# Patient Record
Sex: MALE | URBAN - METROPOLITAN AREA
[De-identification: names, ages, dates, MRNs, and addresses within clinical notes are randomized per-mention and may not be internally consistent; named-entity substitution may affect disease eponyms.]

---

## 2021-11-16 ENCOUNTER — EMERGENCY (EMERGENCY)
Facility: HOSPITAL | Age: 51
LOS: 1 days | Discharge: HOME | End: 2021-11-16
Attending: EMERGENCY MEDICINE
Payer: COMMERCIAL

## 2021-11-16 VITALS
OXYGEN SATURATION: 97 % | HEIGHT: 70 IN | DIASTOLIC BLOOD PRESSURE: 102 MMHG | RESPIRATION RATE: 18 BRPM | SYSTOLIC BLOOD PRESSURE: 143 MMHG | HEART RATE: 59 BPM | WEIGHT: 179.9 LBS | TEMPERATURE: 98 F

## 2021-11-16 VITALS
SYSTOLIC BLOOD PRESSURE: 141 MMHG | OXYGEN SATURATION: 96 % | TEMPERATURE: 98 F | HEART RATE: 80 BPM | DIASTOLIC BLOOD PRESSURE: 96 MMHG | RESPIRATION RATE: 16 BRPM

## 2021-11-16 LAB
ALBUMIN SERPL ELPH-MCNC: 4.4 G/DL — SIGNIFICANT CHANGE UP (ref 3.3–5)
ALP SERPL-CCNC: 112 U/L — SIGNIFICANT CHANGE UP (ref 40–120)
ALT FLD-CCNC: 23 U/L — SIGNIFICANT CHANGE UP (ref 10–45)
ANION GAP SERPL CALC-SCNC: 13 MMOL/L — SIGNIFICANT CHANGE UP (ref 5–17)
APTT BLD: 29.6 SEC — SIGNIFICANT CHANGE UP (ref 27.5–35.5)
AST SERPL-CCNC: 27 U/L — SIGNIFICANT CHANGE UP (ref 10–40)
BASOPHILS # BLD AUTO: 0.04 K/UL — SIGNIFICANT CHANGE UP (ref 0–0.2)
BASOPHILS NFR BLD AUTO: 0.9 % — SIGNIFICANT CHANGE UP (ref 0–2)
BILIRUB SERPL-MCNC: 0.3 MG/DL — SIGNIFICANT CHANGE UP (ref 0.2–1.2)
BUN SERPL-MCNC: 12 MG/DL — SIGNIFICANT CHANGE UP (ref 7–23)
CALCIUM SERPL-MCNC: 9.3 MG/DL — SIGNIFICANT CHANGE UP (ref 8.4–10.5)
CHLORIDE SERPL-SCNC: 102 MMOL/L — SIGNIFICANT CHANGE UP (ref 96–108)
CO2 SERPL-SCNC: 25 MMOL/L — SIGNIFICANT CHANGE UP (ref 22–31)
CREAT SERPL-MCNC: 0.69 MG/DL — SIGNIFICANT CHANGE UP (ref 0.5–1.3)
EOSINOPHIL # BLD AUTO: 0.13 K/UL — SIGNIFICANT CHANGE UP (ref 0–0.5)
EOSINOPHIL NFR BLD AUTO: 2.8 % — SIGNIFICANT CHANGE UP (ref 0–6)
GLUCOSE SERPL-MCNC: 118 MG/DL — HIGH (ref 70–99)
HCT VFR BLD CALC: 46.4 % — SIGNIFICANT CHANGE UP (ref 39–50)
HGB BLD-MCNC: 16.1 G/DL — SIGNIFICANT CHANGE UP (ref 13–17)
IMM GRANULOCYTES NFR BLD AUTO: 0.4 % — SIGNIFICANT CHANGE UP (ref 0–1.5)
INR BLD: 0.89 RATIO — SIGNIFICANT CHANGE UP (ref 0.88–1.16)
LYMPHOCYTES # BLD AUTO: 1.92 K/UL — SIGNIFICANT CHANGE UP (ref 1–3.3)
LYMPHOCYTES # BLD AUTO: 41.3 % — SIGNIFICANT CHANGE UP (ref 13–44)
MCHC RBC-ENTMCNC: 31.7 PG — SIGNIFICANT CHANGE UP (ref 27–34)
MCHC RBC-ENTMCNC: 34.7 GM/DL — SIGNIFICANT CHANGE UP (ref 32–36)
MCV RBC AUTO: 91.3 FL — SIGNIFICANT CHANGE UP (ref 80–100)
MONOCYTES # BLD AUTO: 0.39 K/UL — SIGNIFICANT CHANGE UP (ref 0–0.9)
MONOCYTES NFR BLD AUTO: 8.4 % — SIGNIFICANT CHANGE UP (ref 2–14)
NEUTROPHILS # BLD AUTO: 2.15 K/UL — SIGNIFICANT CHANGE UP (ref 1.8–7.4)
NEUTROPHILS NFR BLD AUTO: 46.2 % — SIGNIFICANT CHANGE UP (ref 43–77)
NRBC # BLD: 0 /100 WBCS — SIGNIFICANT CHANGE UP (ref 0–0)
PLATELET # BLD AUTO: 226 K/UL — SIGNIFICANT CHANGE UP (ref 150–400)
POTASSIUM SERPL-MCNC: 4.3 MMOL/L — SIGNIFICANT CHANGE UP (ref 3.5–5.3)
POTASSIUM SERPL-SCNC: 4.3 MMOL/L — SIGNIFICANT CHANGE UP (ref 3.5–5.3)
PROT SERPL-MCNC: 7.3 G/DL — SIGNIFICANT CHANGE UP (ref 6–8.3)
PROTHROM AB SERPL-ACNC: 10.7 SEC — SIGNIFICANT CHANGE UP (ref 10.6–13.6)
RBC # BLD: 5.08 M/UL — SIGNIFICANT CHANGE UP (ref 4.2–5.8)
RBC # FLD: 12.2 % — SIGNIFICANT CHANGE UP (ref 10.3–14.5)
SODIUM SERPL-SCNC: 140 MMOL/L — SIGNIFICANT CHANGE UP (ref 135–145)
TROPONIN T, HIGH SENSITIVITY RESULT: <6 NG/L — SIGNIFICANT CHANGE UP (ref 0–51)
WBC # BLD: 4.65 K/UL — SIGNIFICANT CHANGE UP (ref 3.8–10.5)
WBC # FLD AUTO: 4.65 K/UL — SIGNIFICANT CHANGE UP (ref 3.8–10.5)

## 2021-11-16 PROCEDURE — 82962 GLUCOSE BLOOD TEST: CPT

## 2021-11-16 PROCEDURE — 85025 COMPLETE CBC W/AUTO DIFF WBC: CPT

## 2021-11-16 PROCEDURE — 70450 CT HEAD/BRAIN W/O DYE: CPT | Mod: MA

## 2021-11-16 PROCEDURE — 70498 CT ANGIOGRAPHY NECK: CPT | Mod: MA

## 2021-11-16 PROCEDURE — 70496 CT ANGIOGRAPHY HEAD: CPT | Mod: MA

## 2021-11-16 PROCEDURE — 99285 EMERGENCY DEPT VISIT HI MDM: CPT

## 2021-11-16 PROCEDURE — 0042T: CPT

## 2021-11-16 PROCEDURE — 70498 CT ANGIOGRAPHY NECK: CPT | Mod: 26,MA

## 2021-11-16 PROCEDURE — 85730 THROMBOPLASTIN TIME PARTIAL: CPT

## 2021-11-16 PROCEDURE — 80053 COMPREHEN METABOLIC PANEL: CPT

## 2021-11-16 PROCEDURE — 99284 EMERGENCY DEPT VISIT MOD MDM: CPT | Mod: 25

## 2021-11-16 PROCEDURE — 93005 ELECTROCARDIOGRAM TRACING: CPT

## 2021-11-16 PROCEDURE — 96374 THER/PROPH/DIAG INJ IV PUSH: CPT | Mod: XU

## 2021-11-16 PROCEDURE — 84484 ASSAY OF TROPONIN QUANT: CPT

## 2021-11-16 PROCEDURE — 85610 PROTHROMBIN TIME: CPT

## 2021-11-16 PROCEDURE — 70496 CT ANGIOGRAPHY HEAD: CPT | Mod: 26,MA

## 2021-11-16 RX ORDER — ONDANSETRON 8 MG/1
4 TABLET, FILM COATED ORAL ONCE
Refills: 0 | Status: COMPLETED | OUTPATIENT
Start: 2021-11-16 | End: 2021-11-16

## 2021-11-16 RX ORDER — MECLIZINE HCL 12.5 MG
1 TABLET ORAL
Qty: 15 | Refills: 0
Start: 2021-11-16

## 2021-11-16 RX ORDER — ONDANSETRON 8 MG/1
1 TABLET, FILM COATED ORAL
Qty: 6 | Refills: 0
Start: 2021-11-16

## 2021-11-16 RX ORDER — MECLIZINE HCL 12.5 MG
25 TABLET ORAL ONCE
Refills: 0 | Status: COMPLETED | OUTPATIENT
Start: 2021-11-16 | End: 2021-11-16

## 2021-11-16 RX ADMIN — Medication 25 MILLIGRAM(S): at 19:35

## 2021-11-16 RX ADMIN — ONDANSETRON 4 MILLIGRAM(S): 8 TABLET, FILM COATED ORAL at 19:35

## 2021-11-16 NOTE — ED ADULT NURSE NOTE - BREATHING, MLM
I spoke to [place]ula in Endo and she is aware the pt is cancelled.  Pt has a fever and a cough    I cancelled the appt in [facility]    Routed back to surgery scheduling to reschedule Spontaneous, unlabored and symmetrical

## 2021-11-16 NOTE — ED PROVIDER NOTE - OBJECTIVE STATEMENT
51M dizziness onset around 18:00PM associated with nausea and vomiting - multiple episodes. Patient states that he was walking to get some soda when his symptoms onset. No chest pain, abdominal pain. No recent illness 51M dizziness onset around 18:00PM associated with nausea and vomiting - multiple episodes. Patient states that he was walking to get some soda when his symptoms onset. No chest pain, abdominal pain. No recent illness, f/c. No ear pain. 51M dizziness onset around 18:00PM associated with nausea and vomiting - multiple episodes. Patient states that he was walking to get some soda when his symptoms onset. No chest pain, abdominal pain. No recent illness, f/c. No ear pain.  Attending Devi Sanderson: agree with above. additionally pt denies any falls or trauma. no recent uri. no recent neck manipulation. no black orbloody stools. no new medications.no numbness or tingling. symptoms improved with closing eyes in the ambulance. denies any chest or back pain.

## 2021-11-16 NOTE — ED PROVIDER NOTE - CLINICAL SUMMARY MEDICAL DECISION MAKING FREE TEXT BOX
Attending Devi Sanderson: 50 yo male presenting with dizziness. upon arrival code stroke called in triage and pt taken to ct scan. ct scans negative for acute pathology. concern for possible peripheral process with nystagmus and history. pt given anti-vertigo medications with improvement. no evidence of carotid or vertebral dissection. will d/w neuro

## 2021-11-16 NOTE — ED PROVIDER NOTE - NS ED ROS FT
CONST: no fevers, no chills, no trauma  EYES: no pain, no blurry vision   ENT: no sore throat, no epistaxis, no rhinorrhea  CV: no chest pain, no palpitations, no orthopnea, no extremity pain or swelling  RESP: no shortness of breath, no cough, no sputum, no pleurisy, no wheezing  ABD: no abdominal pain, no nausea, no vomiting, no diarrhea, no black or bloody stool  : no dysuria, no hematuria, no frequency, no urgency  MSK: no back pain, no neck pain, no extremity pain  NEURO: no headache, no sensory disturbances, no focal weakness, + dizziness  HEME: no easy bleeding or bruising  SKIN: no diaphoresis, no rash

## 2021-11-16 NOTE — ED PROVIDER NOTE - NSFOLLOWUPINSTRUCTIONS_ED_ALL_ED_FT
Please see attached information on Vertigo.     Return to the ER for new or worsening dizziness, vomiting, or any other concerning symptoms.     Follow up with ENT and neurology.      your medications and take as directed.

## 2021-11-16 NOTE — CONSULT NOTE ADULT - ASSESSMENT
51 year old right-handed male who reports no significant PMHx who p/w dizziness onset around 18:00PM 11/16/21, associated with nausea and vomiting. Patient states that he was walking to get some soda when his symptoms onset.     LKW: 18:00PM 11/16/21  mRS: 0  NIHSS: 0    tPA not offered -- symptoms more consistent with peripheral vertigo given unidirectional nystagmus, report of tinnitus, and improvement of symptoms after medical treatment (meclizine, ondansetron)  Thrombectomy not performed -- no LVO    Impression: vertiginous symptoms w/ high clinical suspicion for peripheral etiology (unidirectional right-beating nystagmus, tinnitus) w/ negative CT head, CT angiography, and CT perfusion    Plan:  - ***    * Case and plan to be discussed with Neurology Attending * 51 year old right-handed male who reports no significant PMHx who p/w dizziness onset around 18:00PM 11/16/21, associated with nausea and vomiting. Patient states that he was walking to get some soda when his symptoms onset.     LKW: 18:00PM 11/16/21  mRS: 0  NIHSS: 0    tPA not offered -- symptoms more consistent with peripheral vertigo given unidirectional nystagmus, report of tinnitus, and improvement of symptoms after medical treatment (meclizine, ondansetron)  Thrombectomy not performed -- no LVO    Impression: vertiginous symptoms w/ high clinical suspicion for peripheral etiology (unidirectional right-beating nystagmus, tinnitus) w/ negative CT head, CT angiography, and CT perfusion    Plan:  - follow-up outpatient with ENT  - can follow-up with outpatient Neurology     * Case and plan to be discussed with Neurology Attending * 51 year old right-handed male who reports no significant PMHx who p/w dizziness onset around 18:00PM 11/16/21, associated with nausea and vomiting. Patient states that he was walking to get some soda when his symptoms onset.     LKW: 18:00PM 11/16/21  mRS: 0  NIHSS: 0    tPA not offered -- symptoms more consistent with peripheral vertigo given unidirectional nystagmus, report of tinnitus, and improvement of symptoms after medical treatment (meclizine, ondansetron)  Thrombectomy not performed -- no LVO    Impression: vertiginous symptoms w/ high clinical suspicion for peripheral etiology (unidirectional right-beating nystagmus, tinnitus) w/ negative CT head, CT angiography, and CT perfusion    Plan:  - follow-up outpatient with ENT  - can follow-up with outpatient Neurology     * Case and plan discussed with Neurology Attending * 51 year old right-handed male who reports no significant PMHx who p/w dizziness onset around 18:00PM 11/16/21, associated with nausea and vomiting. Patient states that he was walking to get some soda when his symptoms onset.     LKW: 18:00PM 11/16/21  mRS: 0  NIHSS: 0    tPA not offered -- symptoms more consistent with peripheral vertigo given unidirectional nystagmus, report of tinnitus, and improvement of symptoms after medical treatment (meclizine, ondansetron)  Thrombectomy not performed -- no LVO    Impression: vertiginous symptoms w/ high clinical suspicion for peripheral etiology (unidirectional right-beating nystagmus, tinnitus) w/ negative CT head, CT angiography, and CT perfusion    Plan:  - follow-up outpatient with ENT  - can follow-up with outpatient Neurology     * Case and plan discussed with Neurology Attending *    Attending statement:  I was not present during the pt's evaluation by the resident.  Given the clinical presentation and outcome of the test done in the ER, I agree with the above assessment and recommendations.

## 2021-11-16 NOTE — ED PROVIDER NOTE - PROGRESS NOTE DETAILS
Attending Devi Sanderson: pt feeling better. symptoms have resolved. Cleared by neurology will DC w ENT/neuro follow up. RYLEE Jane PGY3

## 2021-11-16 NOTE — CONSULT NOTE ADULT - SUBJECTIVE AND OBJECTIVE BOX
Neurology  Consult Note  11-16-21    Name: ESPINOZA LITTLE; 51y (1970)    Reason for Admission:     Chief Complaint:     HPI:  51 year old right-handed male who reports no significant PMHx who p/w dizziness onset around 18:00PM 11/16/21, associated with nausea and vomiting. Patient states that he was walking to get some soda when his symptoms onset. Code stroke in the ED.     mRS: 0  NIHSS: 0      Review of Systems:     CONSTITUTIONAL: No fevers or chills  EYES/ENT: No visual changes or no throat pain   RESPIRATORY: No hemoptysis or shortness of breath  CARDIOVASCULAR: No chest pain or palpitations  GASTROINTESTINAL: No melena or hematochezia.  GENITOURINARY: No dysuria or hematuria  NEUROLOGICAL: +As stated in HPI above  SKIN: No itching, burning, rashes, or lesions   All other review of systems is negative unless indicated above.        PMHx:     PFHx:     PSuHx:       Medications:  MEDICATIONS  (STANDING):    MEDICATIONS  (PRN):      Vitals:  T(C): 36.6 (11-16-21 @ 18:52), Max: 36.6 (11-16-21 @ 18:52)  HR: 61 (11-16-21 @ 19:30) (59 - 61)  BP: 160/93 (11-16-21 @ 19:30) (143/102 - 160/93)  RR: 18 (11-16-21 @ 19:30) (18 - 18)  SpO2: 100% (11-16-21 @ 19:30) (97% - 100%)    Physical Examination:      General: in distress, vomiting into bag  Head: normocephalic, atraumatic  Abdomen: soft, non-tender  Extremities: no lower extremity edema, no deformities    Neurologic:    - Mental Status: Alert, awake, oriented to person, place, and time; speech is fluent with intact naming, repetition, and comprehension;     - Cranial Nerves:  II: Visual fields are full to confrontation; pupils are equal, round, and reactive to light;   III, IV, VI: Extraocular movements are intact with unidirectional right-beating nystagmus  V: Facial sensation is intact in the V1-V3 distribution bilaterally  VII: Face is symmetric with normal eye closure and smile  VIII: Hearing is intact to conversation  IX, X: Uvula is midline and soft palate rises symmetrically.  XI: Head turning intact.  XII: Tongue protrudes in the midline.    -Motor/Strength Testing:                                 Right           Left  Deltoid                     5                 5  Biceps                      5                 5  Triceps                     5                 5    Hip Flex                   5                  5  Knee Flex                 5                  5  Knee Ext	      5                  5  Dorsiflex                  5                  5  Plantarflex               5                  5    - There is no pronator drift. Normal muscle bulk and tone throughout.    - Reflexes:   Bicep (C5/C6):                  R 2+ --- L 2+   Brachioradialis (C5/C6) :   R 2+ --- L 2+   Patella (L3/L4) :                 R 2+ --- L 2+   Ankle (S1) :                       R 2+ --- L 2+     - Plant responses down bilaterally.    - Sensory: Intact throughout to light touch.   - Coordination: Finger-nose-finger intact without dysmetria.      Labs:                        16.1   4.65  )-----------( 226      ( 16 Nov 2021 19:15 )             46.4     11-16    140  |  102  |  12  ----------------------------<  118<H>  4.3   |  25  |  0.69    Ca    9.3      16 Nov 2021 19:15    TPro  7.3  /  Alb  4.4  /  TBili  0.3  /  DBili  x   /  AST  27  /  ALT  23  /  AlkPhos  112  11-16    CAPILLARY BLOOD GLUCOSE  106 (16 Nov 2021 19:14)      POCT Blood Glucose.: 106 mg/dL (16 Nov 2021 18:57)    LIVER FUNCTIONS - ( 16 Nov 2021 19:15 )  Alb: 4.4 g/dL / Pro: 7.3 g/dL / ALK PHOS: 112 U/L / ALT: 23 U/L / AST: 27 U/L / GGT: x             PT/INR - ( 16 Nov 2021 19:15 )   PT: 10.7 sec;   INR: 0.89 ratio         PTT - ( 16 Nov 2021 19:15 )  PTT:29.6 sec     Radiology:  CT Angio Neck w/ IV Cont (11.16.21 @ 19:41)  IMPRESSION:  CTA neck: No vessel occlusion or significant stenosis by NASCET criteria.  CTA brain: No vessel occlusion or significant stenosis.  CT Perfusion: No perfusion abnormality.

## 2021-11-16 NOTE — ED PROVIDER NOTE - PHYSICAL EXAMINATION
Attending Devi Sanderson: Gen: NAD, heent: atrauamtic, eomi, perrla,right sided nystagmus,  mmm, op pink, uvula midline, neck; nttp, no nuchal rigidity, chest: nttp, no crepitus, cv: rrr, no murmurs, lungs: ctab, abd: soft, nontender, nondistended, no peritoneal signs, +BS, no guarding, ext: wwp, neg homans, skin: no rash, neuro: awake and alert, following commands, speech clear, sensation and strength intact, no focal deficits no protonator drift

## 2021-11-16 NOTE — ED PROVIDER NOTE - NSFOLLOWUPCLINICS_GEN_ALL_ED_FT
[facility] - ENT  Otolaryngology (ENT)  [address] [place] [address]  Phone: [phone]  Fax:     [facility]  Neurology  [address] [place] [id number]  Phone: [phone]  Fax:

## 2021-11-16 NOTE — STROKE CODE NOTE - NSSTROKETPAEXCLREL_OTHER_FT
Unidirectional nystagmus associated with ear pressure, more consistent with peripheral vertigo Unidirectional nystagmus associated with tinnitus, more consistent with peripheral vertigo

## 2021-11-16 NOTE — ED ADULT NURSE NOTE - OBJECTIVE STATEMENT
Pt is a 51 yr old male with no pmh coming from home for new onset of dizziness and nausea. Pt states around 6 pm he stood up and had sudden onset of dizziness that would not go away. Pt's wife and daughter noticed he became pale and diaphoretic- did not pass out but felt like he could- so they called 911. Upon arrival to the ED a code stroke was called- pt was directed to CT. Pt at the time was nauseous and complaining of dizziness. Pt is a/ox 3- ambulatory however slightly off balance. Pt neuro exam negative for any gross abnormalities. Pt vitals stable.

## 2021-11-16 NOTE — ED PROVIDER NOTE - ATTENDING CONTRIBUTION TO CARE
Attending MD Devi Sanderson:  I personally have seen and examined this patient.  Resident note reviewed and agree on plan of care and except where noted.  See HPI, PE, and MDM for details.

## 2021-11-16 NOTE — ED PROVIDER NOTE - RAPID ASSESSMENT
51y M p/w dizziness x1 hour ago. Sx worse with movement. Endorses associated nausea and vomiting.    Patient was seen as a tele QDOC patient. The patient will be seen and further worked up in the main emergency department and their care will be completed by the main emergency department team along with a thorough physical exam. Receiving team will follow up on labs, analgesia, any clinical imaging, reassess and disposition as clinically indicated, all decisions regarding the progression of care will be made at their discretion.    Scribe Statement: Yovani GOMEZ, attest that this documentation has been prepared under the direction and in the presence of Angela Gray () 51y M p/w dizziness x1 hour ago. Sx worse with movement. Endorses associated nausea and vomiting. Pt sitting with eyes closed because he his too dizzy to open them.     Patient was seen as a tele QDOC patient. The patient will be seen and further worked up in the main emergency department and their care will be completed by the main emergency department team along with a thorough physical exam. Receiving team will follow up on labs, analgesia, any clinical imaging, reassess and disposition as clinically indicated, all decisions regarding the progression of care will be made at their discretion.    Scribe Statement: Yovani GOMEZ, attest that this documentation has been prepared under the direction and in the presence of Angela Gray () 51y M p/w dizziness x1 hour ago. Sx worse with movement. Endorses associated nausea and vomiting. Pt sitting with eyes closed because he his too dizzy to open them.     Patient was seen as a tele QDOC patient. The patient will be seen and further worked up in the main emergency department and their care will be completed by the main emergency department team along with a thorough physical exam. Receiving team will follow up on labs, analgesia, any clinical imaging, reassess and disposition as clinically indicated, all decisions regarding the progression of care will be made at their discretion.    Scribe Statement: I, Yovani Márquez, attest that this documentation has been prepared under the direction and in the presence of Angela Gray ()    JASON, Dr. Gray,  personally performed the rapid assessment described in the documentation, reviewed the rapid assessment documentation recorded by the scribe in my presence and it accurately and completely records my words and action.

## 2021-12-06 PROBLEM — Z00.00 ENCOUNTER FOR PREVENTIVE HEALTH EXAMINATION: Status: ACTIVE | Noted: 2021-12-06

## 2021-12-13 ENCOUNTER — APPOINTMENT (OUTPATIENT)
Dept: OTOLARYNGOLOGY | Facility: CLINIC | Age: 51
End: 2021-12-13

## 2023-07-29 NOTE — ED PROVIDER NOTE - PATIENT PORTAL LINK FT
You can access the FollowMyHealth Patient Portal offered by [facility] by registering at the following website: [email or web address]. By joining [facility]’s FollowMyHealth portal, you will also be able to view your health information using other applications (apps) compatible with our system. 29-Jul-2023 19:37

## 2024-10-18 NOTE — ED PROVIDER NOTE - DISPOSITION TYPE
Called to reviewed FNA results which were non-diagnostic. We discussed  1) repeat FNA of both right thyroid nodules  2) right hemithyroidectomy  3) repeat thyroid US (less recommended).    We decided repeat thyroid FNA but I recommend waiting until next month as his neck is still bruised and sore.      We discussed repeat IR versus me and I will set this up in [place].  My office will reach out to schedule.    Laura Soni MD    Head & Neck Surgical Oncology & Reconstruction  Department of Otolaryngology - Head and Neck Surgery    DISCHARGE